# Patient Record
Sex: MALE | Race: BLACK OR AFRICAN AMERICAN | Employment: UNEMPLOYED | URBAN - METROPOLITAN AREA
[De-identification: names, ages, dates, MRNs, and addresses within clinical notes are randomized per-mention and may not be internally consistent; named-entity substitution may affect disease eponyms.]

---

## 2021-09-14 ENCOUNTER — HOSPITAL ENCOUNTER (EMERGENCY)
Facility: CLINIC | Age: 71
Discharge: HOME | End: 2021-09-15
Attending: EMERGENCY MEDICINE | Admitting: EMERGENCY MEDICINE

## 2021-09-14 DIAGNOSIS — M25.512 ACUTE PAIN OF LEFT SHOULDER: ICD-10-CM

## 2021-09-14 PROCEDURE — 99283 EMERGENCY DEPT VISIT LOW MDM: CPT | Performed by: EMERGENCY MEDICINE

## 2021-09-14 RX ORDER — UBIDECARENONE 75 MG
100 CAPSULE ORAL DAILY
COMMUNITY

## 2021-09-14 RX ORDER — OMEPRAZOLE 40 MG/1
40 CAPSULE, DELAYED RELEASE ORAL DAILY
COMMUNITY

## 2021-09-15 VITALS
RESPIRATION RATE: 18 BRPM | DIASTOLIC BLOOD PRESSURE: 66 MMHG | SYSTOLIC BLOOD PRESSURE: 130 MMHG | TEMPERATURE: 97.6 F | WEIGHT: 238 LBS | HEART RATE: 79 BPM | OXYGEN SATURATION: 99 %

## 2021-09-15 PROCEDURE — 250N000013 HC RX MED GY IP 250 OP 250 PS 637: Performed by: EMERGENCY MEDICINE

## 2021-09-15 RX ORDER — ACETAMINOPHEN 325 MG/1
325-650 TABLET ORAL EVERY 6 HOURS PRN
Qty: 30 TABLET | Refills: 0 | Status: SHIPPED | OUTPATIENT
Start: 2021-09-15

## 2021-09-15 RX ORDER — ACETAMINOPHEN 500 MG
1000 TABLET ORAL ONCE
Status: COMPLETED | OUTPATIENT
Start: 2021-09-15 | End: 2021-09-15

## 2021-09-15 RX ORDER — NAPROXEN 500 MG/1
500 TABLET ORAL 2 TIMES DAILY PRN
Qty: 24 TABLET | Refills: 0 | Status: SHIPPED | OUTPATIENT
Start: 2021-09-15 | End: 2021-09-23

## 2021-09-15 RX ADMIN — ACETAMINOPHEN 1000 MG: 500 TABLET, FILM COATED ORAL at 00:17

## 2021-09-15 NOTE — DISCHARGE INSTRUCTIONS
Please make an appointment to follow up with Sports Medicine (phone: [phone]) as soon as possible.    Do the pendulum swings a few times per day.  Take the shoulder out of the sling several times a day to do the swings.    Use Tylenol, ibuprofen and ice for your shoulder.

## 2021-09-15 NOTE — ED PROVIDER NOTES
ED Provider Note  [facility]      History     Chief Complaint   Patient presents with     Arm Pain     cponstant right arm pain for 1 week, started from shoulder down to elbow. increase pain during movement     The history is provided by the patient.     Mirza Miranda is a 70 year old otherwise healthy male who presents to the ED for an evaluation of right arm and shoulder pain. Patient reports that he has had pain in his upper right arm into his shoulder for the past week. Denies any injuries or falls. He states that he has not had pain in his right arm in the past.       Past Medical History  History reviewed. No pertinent past medical history.  Past Surgical History:   Procedure Laterality Date     GI SURGERY  2015     acetaminophen (TYLENOL) 325 MG tablet  cyanocobalamin (VITAMIN B-12) 100 MCG tablet  naproxen (NAPROSYN) 500 MG tablet  omeprazole (PRILOSEC) 40 MG DR capsule      No Known Allergies  Family History  History reviewed. No pertinent family history.  Social History   Social History     Tobacco Use     Smoking status: Never Smoker     Smokeless tobacco: Never Used   Substance Use Topics     Alcohol use: Never     Drug use: Never      Past medical history, past surgical history, medications, allergies, family history, and social history were reviewed with the patient. No additional pertinent items.       Review of Systems  A complete review of systems was performed with pertinent positives and negatives noted in the HPI, and all other systems negative.   ROS: 14 point ROS neg other than the symptoms noted above in the HPI.    Physical Exam   BP: 130/66  Pulse: 79  Temp: 97.6  F (36.4  C)  Resp: 18  Weight: 108 kg (238 lb)  SpO2: 99 %  Physical Exam  Physical Exam   Constitutional: oriented to person, place, and time. appears well-developed and well-nourished.   HENT:   Head: Normocephalic and atraumatic.   Neck: Normal range of motion.   Pulmonary/Chest: Effort  normal. No respiratory distress.   Cardiac: No murmurs, rubs, gallops. RRR.  Abdominal: Abdomen soft, nontender, nondistended. No rebound tenderness.  MSK: No swelling or deformity or evidence of trauma to the right shoulder, range of motion passively intact.  Difficulty abducting past 90 degrees.  Pain with internal and external rotation.  No swelling over the shoulder.  No tenderness over the shoulder, clavicles, humerus.  Range of motion of elbow and wrist normal.  Sensation was intact throughout the extremity.  Neurological: alert and oriented to person, place, and time.   Skin: Skin is warm and dry.   Psychiatric:  normal mood and affect.  behavior is normal. Thought content normal.     ED Course     12:06 AM  The patient was seen and examined by Chuck Yuen MD in Room ED02.   Procedures    \   No results found for any visits on 09/14/21.  Medications - No data to display     Assessments & Plan (with Medical Decision Making)   MDM  Patient is presenting with shoulder pain.  This is nontraumatic.  There is no signs of infection or septic arthritis on examination, vitals are stable.  He did not have trauma.  There does not appear to be dislocation.  Limb is otherwise neurovascularly intact.  There is no pain with neck movement or concern for cervical spine involvement.  Patient clinically likely has rotator cuff impingement states he is having difficulty with internal and external rotation addition to lifting his arm after 90 degrees to extension. No Swelling/signs of DVT The patient did not want an x-ray because he has been here for 2 hours and would like to be discharged.  He does not want a sling.  He was given naproxen, Tylenol and referral to sports medicine.  He will return if worsening.    I have reviewed the nursing notes. I have reviewed the findings, diagnosis, plan and need for follow up with the patient.    New Prescriptions    No medications on file       Final diagnoses:   Acute pain of left  shoulder       --  I, Юлия Proctor, am serving as a trained medical scribe to document services personally performed by Chuck Yuen MD, based on the provider's statements to me.     I, Chuck Yuen MD, was physically present and have reviewed and verified the accuracy of this note documented by Юлия Proctor.    Chuck Yuen MD  [facility] EMERGENCY DEPARTMENT  9/14/2021     Chuck Yuen MD  09/15/21 0033       Chuck Yuen MD  09/15/21 0047